# Patient Record
Sex: FEMALE | Race: WHITE | NOT HISPANIC OR LATINO | Employment: FULL TIME | URBAN - METROPOLITAN AREA
[De-identification: names, ages, dates, MRNs, and addresses within clinical notes are randomized per-mention and may not be internally consistent; named-entity substitution may affect disease eponyms.]

---

## 2020-05-05 ENCOUNTER — OFFICE VISIT (OUTPATIENT)
Dept: URGENT CARE | Facility: MEDICAL CENTER | Age: 60
End: 2020-05-05
Payer: COMMERCIAL

## 2020-05-05 VITALS
RESPIRATION RATE: 16 BRPM | WEIGHT: 145 LBS | HEART RATE: 88 BPM | TEMPERATURE: 97.9 F | BODY MASS INDEX: 26.68 KG/M2 | OXYGEN SATURATION: 96 % | HEIGHT: 62 IN

## 2020-05-05 DIAGNOSIS — Z20.822 EXPOSURE TO COVID-19 VIRUS: ICD-10-CM

## 2020-05-05 DIAGNOSIS — J30.9 ALLERGIC RHINITIS, UNSPECIFIED SEASONALITY, UNSPECIFIED TRIGGER: ICD-10-CM

## 2020-05-05 DIAGNOSIS — J45.901 EXACERBATION OF ASTHMA, UNSPECIFIED ASTHMA SEVERITY, UNSPECIFIED WHETHER PERSISTENT: Primary | ICD-10-CM

## 2020-05-05 PROCEDURE — U0003 INFECTIOUS AGENT DETECTION BY NUCLEIC ACID (DNA OR RNA); SEVERE ACUTE RESPIRATORY SYNDROME CORONAVIRUS 2 (SARS-COV-2) (CORONAVIRUS DISEASE [COVID-19]), AMPLIFIED PROBE TECHNIQUE, MAKING USE OF HIGH THROUGHPUT TECHNOLOGIES AS DESCRIBED BY CMS-2020-01-R: HCPCS | Performed by: FAMILY MEDICINE

## 2020-05-05 PROCEDURE — 99214 OFFICE O/P EST MOD 30 MIN: CPT | Performed by: FAMILY MEDICINE

## 2020-05-05 RX ORDER — LORATADINE 10 MG/1
10 TABLET ORAL DAILY
Qty: 30 TABLET | Refills: 0 | Status: SHIPPED | OUTPATIENT
Start: 2020-05-05

## 2020-05-05 RX ORDER — FLUTICASONE PROPIONATE 50 MCG
2 SPRAY, SUSPENSION (ML) NASAL DAILY
Qty: 16 G | Refills: 0 | Status: SHIPPED | OUTPATIENT
Start: 2020-05-05

## 2020-05-05 RX ORDER — ALBUTEROL SULFATE 90 UG/1
2 AEROSOL, METERED RESPIRATORY (INHALATION) EVERY 6 HOURS PRN
Qty: 1 INHALER | Refills: 1 | Status: SHIPPED | OUTPATIENT
Start: 2020-05-05

## 2020-05-07 ENCOUNTER — TELEPHONE (OUTPATIENT)
Dept: URGENT CARE | Facility: MEDICAL CENTER | Age: 60
End: 2020-05-07

## 2020-05-07 LAB — SARS-COV-2 RNA SPEC QL NAA+PROBE: NOT DETECTED

## 2024-10-30 ENCOUNTER — OFFICE VISIT (OUTPATIENT)
Dept: URGENT CARE | Facility: CLINIC | Age: 64
End: 2024-10-30

## 2024-10-30 ENCOUNTER — APPOINTMENT (OUTPATIENT)
Dept: RADIOLOGY | Facility: CLINIC | Age: 64
End: 2024-10-30

## 2024-10-30 VITALS
SYSTOLIC BLOOD PRESSURE: 196 MMHG | TEMPERATURE: 98.6 F | RESPIRATION RATE: 20 BRPM | HEART RATE: 83 BPM | DIASTOLIC BLOOD PRESSURE: 81 MMHG | OXYGEN SATURATION: 95 %

## 2024-10-30 DIAGNOSIS — R06.02 SHORTNESS OF BREATH: ICD-10-CM

## 2024-10-30 DIAGNOSIS — J44.1 COPD WITH ACUTE EXACERBATION (HCC): Primary | ICD-10-CM

## 2024-10-30 PROCEDURE — 71046 X-RAY EXAM CHEST 2 VIEWS: CPT

## 2024-10-30 PROCEDURE — G0382 LEV 3 HOSP TYPE B ED VISIT: HCPCS | Performed by: STUDENT IN AN ORGANIZED HEALTH CARE EDUCATION/TRAINING PROGRAM

## 2024-10-30 RX ORDER — PREDNISONE 20 MG/1
40 TABLET ORAL DAILY
Qty: 10 TABLET | Refills: 0 | Status: SHIPPED | OUTPATIENT
Start: 2024-10-30 | End: 2024-11-04

## 2024-10-30 RX ORDER — AZITHROMYCIN 250 MG/1
TABLET, FILM COATED ORAL
Qty: 6 TABLET | Refills: 0 | Status: SHIPPED | OUTPATIENT
Start: 2024-10-30 | End: 2024-11-03

## 2024-10-31 NOTE — PROGRESS NOTES
[facility]        NAME: Keturah Jackson is a 64 y.o. female  : 1960    MRN: [id number]  DATE: 2024  TIME: 8:18 PM    Assessment and Orders   COPD with acute exacerbation (HCC) [J44.1]  1. COPD with acute exacerbation (HCC)  azithromycin (ZITHROMAX) 250 mg tablet    predniSONE 20 mg tablet      2. Shortness of breath  XR chest pa and lateral            Plan and Discussion      Patient with shortness of breath, wheezing, increased mucus production and long smoking history.  Has never been officially diagnosed with COPD however, we will treat as if a COPD exacerbation with azithromycin and oral prednisone.  Discussed the importance of smoking cessation.    Risks and benefits discussed. Patient understands and agrees with the plan.     PATIENT INSTRUCTIONS        If tests have been performed at [facility], our office will contact you with results if changes need to be made to the care plan discussed with you at the visit.  [facility]    Follow up with PCP.     If any of the following occur, please report to your nearest ED for evaluation or call 911.   Difficultly breathing or shortness of breath  Chest pain  Acutely worsening symptoms.         Chief Complaint     Chief Complaint   Patient presents with    Sinusitis     X 2 weeks, c/o cough, sweating at night, pain in diaphragm area and shortness of breath         History of Present Illness       Sinusitis  This is a new problem. The current episode started 1 to 4 weeks ago (2 weeks). Associated symptoms include coughing, shortness of breath and a sore throat.   Cough  This is a new problem. The current episode started 1 to 4 weeks ago (2 weeks). The problem occurs constantly. The cough is Productive of sputum. Associated symptoms include nasal congestion, a sore throat, shortness of breath and wheezing. Pertinent negatives include no chest pain. Risk factors for lung disease include  smoking/tobacco exposure. She has tried a beta-agonist inhaler for the symptoms. The treatment provided mild relief.       Review of Systems   Review of Systems   HENT:  Positive for sore throat.    Respiratory:  Positive for cough, shortness of breath and wheezing.    Cardiovascular:  Negative for chest pain.         Current Medications       Current Outpatient Medications:     albuterol (PROVENTIL HFA,VENTOLIN HFA) 90 mcg/act inhaler, Inhale 2 puffs every 6 (six) hours as needed for wheezing, Disp: 1 Inhaler, Rfl: 1    azithromycin (ZITHROMAX) 250 mg tablet, Take 2 tablets today then 1 tablet daily x 4 days, Disp: 6 tablet, Rfl: 0    predniSONE 20 mg tablet, Take 2 tablets (40 mg total) by mouth daily for 5 days, Disp: 10 tablet, Rfl: 0    fluticasone (FLONASE) 50 mcg/act nasal spray, 2 sprays into each nostril daily (Patient not taking: Reported on 10/30/2024), Disp: 16 g, Rfl: 0    loratadine (CLARITIN) 10 mg tablet, Take 1 tablet (10 mg total) by mouth daily (Patient not taking: Reported on 10/30/2024), Disp: 30 tablet, Rfl: 0    Current Allergies     Allergies as of 10/30/2024 - Reviewed 10/30/2024   Allergen Reaction Noted    Penicillins Rash 09/29/2014            The following portions of the patient's history were reviewed and updated as appropriate: allergies, current medications, past family history, past medical history, past social history, past surgical history and problem list.     Past Medical History:   Diagnosis Date    Allergic rhinitis     Asthma     Sinus disease        History reviewed. No pertinent surgical history.    History reviewed. No pertinent family history.      Medications have been verified.        Objective   BP (!) 196/81   Pulse 83   Temp 98.6 °F (37 °C)   Resp 20   SpO2 95%   No LMP recorded.       Physical Exam     Physical Exam  Constitutional:       General: She is not in acute distress.     Appearance: She is not ill-appearing.   HENT:      Nose: Congestion present.    Cardiovascular:      Rate and Rhythm: Normal rate and regular rhythm.   Pulmonary:      Effort: Respiratory distress (Conversational dyspnea) present.      Breath sounds: Rhonchi (Bilateral bases) present.   Neurological:      General: No focal deficit present.      Mental Status: She is alert and oriented to person, place, and time.   Psychiatric:         Mood and Affect: Mood normal.         Behavior: Behavior normal.               Rena Bush DO